# Patient Record
Sex: MALE | Race: WHITE | Employment: FULL TIME | URBAN - METROPOLITAN AREA
[De-identification: names, ages, dates, MRNs, and addresses within clinical notes are randomized per-mention and may not be internally consistent; named-entity substitution may affect disease eponyms.]

---

## 2022-06-20 ENCOUNTER — OFFICE VISIT (OUTPATIENT)
Dept: FAMILY MEDICINE CLINIC | Age: 22
End: 2022-06-20
Payer: COMMERCIAL

## 2022-06-20 ENCOUNTER — HOSPITAL ENCOUNTER (OUTPATIENT)
Age: 22
Discharge: HOME | End: 2022-06-20
Payer: COMMERCIAL

## 2022-06-20 ENCOUNTER — HOSPITAL ENCOUNTER (OUTPATIENT)
Dept: GENERAL RADIOLOGY | Age: 22
Discharge: HOME | End: 2022-06-20
Payer: COMMERCIAL

## 2022-06-20 VITALS
OXYGEN SATURATION: 98 % | WEIGHT: 288 LBS | BODY MASS INDEX: 35.81 KG/M2 | SYSTOLIC BLOOD PRESSURE: 132 MMHG | HEIGHT: 75 IN | HEART RATE: 100 BPM | DIASTOLIC BLOOD PRESSURE: 72 MMHG | TEMPERATURE: 97.4 F

## 2022-06-20 DIAGNOSIS — M25.572 PAIN AND SWELLING OF LEFT ANKLE: Primary | ICD-10-CM

## 2022-06-20 DIAGNOSIS — M25.572 PAIN AND SWELLING OF LEFT ANKLE: ICD-10-CM

## 2022-06-20 DIAGNOSIS — M25.472 PAIN AND SWELLING OF LEFT ANKLE: Primary | ICD-10-CM

## 2022-06-20 DIAGNOSIS — M25.472 PAIN AND SWELLING OF LEFT ANKLE: ICD-10-CM

## 2022-06-20 PROCEDURE — 73610 X-RAY EXAM OF ANKLE: CPT

## 2022-06-20 PROCEDURE — 99203 OFFICE O/P NEW LOW 30 MIN: CPT | Performed by: NURSE PRACTITIONER

## 2022-06-20 RX ORDER — IBUPROFEN 800 MG/1
800 TABLET ORAL EVERY 6 HOURS PRN
COMMUNITY

## 2022-06-20 ASSESSMENT — ENCOUNTER SYMPTOMS
COUGH: 0
SINUS PRESSURE: 0
WHEEZING: 0
RHINORRHEA: 0
DIARRHEA: 0
NAUSEA: 0
SHORTNESS OF BREATH: 0
CHEST TIGHTNESS: 0
SINUS PAIN: 0
VOMITING: 0
SORE THROAT: 0

## 2022-06-20 NOTE — PROGRESS NOTES
Jasiel Hua   25 y.o.  male  [id number]      Chief Complaint   Patient presents with    Ankle Injury     Was playing baseball last night and he stepped on a baseball and felt his ankle pop        Subjective:  21 y.o.male is here for a follow up. He has the following chronic/acute medical problems: There is no problem list on file for this patient. Ankle Pain   The incident occurred 12 to 24 hours ago. Incident location: while playing basketball. The injury mechanism was a twisting injury (stepped on baseball and fell). The pain is present in the left ankle. The quality of the pain is described as aching. The pain is at a severity of 3/10. The pain has been constant since onset. Associated symptoms include an inability to bear weight (not very well - using crutches). Pertinent negatives include no loss of motion, loss of sensation, muscle weakness, numbness or tingling. He reports no foreign bodies present. The symptoms are aggravated by weight bearing. He has tried ice, elevation and NSAIDs for the symptoms. The treatment provided mild relief. Review of Systems   Constitutional: Negative for appetite change, chills, fatigue and fever. HENT: Negative for congestion, ear pain, postnasal drip, rhinorrhea, sinus pressure, sinus pain, sneezing and sore throat. Respiratory: Negative for cough, chest tightness, shortness of breath and wheezing. Cardiovascular: Negative for chest pain and palpitations. Gastrointestinal: Negative for diarrhea, nausea and vomiting. Skin: Negative for rash. Neurological: Negative for dizziness, tingling, light-headedness, numbness and headaches. Current Outpatient Medications   Medication Sig Dispense Refill    ibuprofen (ADVIL;MOTRIN) 800 MG tablet Take 800 mg by mouth every 6 hours as needed for Pain      UNABLE TO FIND 1 pair of crutches to fit 6'3\". 1 each 0     No current facility-administered medications for this visit.         Past medical, family,surgical history reviewed today. Objective:  /72   Pulse 100   Temp 97.4 °F (36.3 °C) (Temporal)   Ht 6' 3\" (1.905 m)   Wt 288 lb (130.6 kg)   SpO2 98%   BMI 36.00 kg/m²   BP Readings from Last 3 Encounters:   06/20/22 132/72     Wt Readings from Last 3 Encounters:   06/20/22 288 lb (130.6 kg) (>99 %, Z= 2.92)*     * Growth percentiles are based on CDC (Boys, 2-20 Years) data. Physical Exam  Constitutional:       Appearance: Normal appearance. HENT:      Head: Normocephalic. Cardiovascular:      Rate and Rhythm: Normal rate and regular rhythm. Heart sounds: Normal heart sounds. Pulmonary:      Effort: Pulmonary effort is normal.      Breath sounds: Normal breath sounds. Musculoskeletal:      Cervical back: Neck supple. Left ankle: Swelling and ecchymosis present. No deformity or lacerations. Tenderness present over the lateral malleolus. Decreased range of motion. Anterior drawer test negative. Normal pulse. Skin:     General: Skin is warm and dry. Neurological:      Mental Status: He is alert and oriented to person, place, and time. Psychiatric:         Mood and Affect: Mood normal.         Behavior: Behavior normal.         No results found for: WBC, HGB, HCT, MCV, PLT  No results found for: NA, K, CL, CO2, BUN, CREATININE, GLUCOSE, CALCIUM, PROT, LABALBU, BILITOT, ALKPHOS, AST, ALT, LABGLOM, GFRAA, AGRATIO, GLOB  No results found for: CHOL  No results found for: TRIG  No results found for: HDL  No results found for: LDLCALC, LDLCHOLESTEROL  No results found for: LABA1C  No results found for: TSHFT4, TSH, TSHHS      ASSESSMENT/PLAN:      1. Pain and swelling of left ankle  RICE. Ibuprofen 600 mg Q 6 as needed for pain. Ordered xray left ankle. Will call with results. Continue to use crutches - new order give for patient to get a pair that fits him. - XR ANKLE LEFT (MIN 3 VIEWS); Future  - UNABLE TO FIND; 1 pair of crutches to fit 6'3\".   Dispense: 1 each; Refill: 0          There are no discontinued medications. Care discussed with patient. Questions answered. Patient verbalizes understanding and agrees with plan. After visit summary provided. Advised to call for any problems, questions, or concerns. Return if symptoms worsen or fail to improve.                                              Signed:  MARGIE Rivera CNP  06/20/22  12:48 PM

## 2022-06-21 DIAGNOSIS — S82.892A CLOSED AVULSION FRACTURE OF LEFT ANKLE, INITIAL ENCOUNTER: Primary | ICD-10-CM

## 2022-06-21 DIAGNOSIS — M19.072 ARTHROSIS OF ANKLE, LEFT: ICD-10-CM

## 2022-06-23 ENCOUNTER — OFFICE VISIT (OUTPATIENT)
Dept: ORTHOPEDIC SURGERY | Age: 22
End: 2022-06-23
Payer: COMMERCIAL

## 2022-06-23 VITALS
HEIGHT: 75 IN | HEART RATE: 79 BPM | BODY MASS INDEX: 35.61 KG/M2 | WEIGHT: 286.4 LBS | SYSTOLIC BLOOD PRESSURE: 137 MMHG | DIASTOLIC BLOOD PRESSURE: 85 MMHG

## 2022-06-23 DIAGNOSIS — S93.402A MODERATE LEFT ANKLE SPRAIN, INITIAL ENCOUNTER: Primary | ICD-10-CM

## 2022-06-23 PROCEDURE — G8417 CALC BMI ABV UP PARAM F/U: HCPCS | Performed by: PHYSICIAN ASSISTANT

## 2022-06-23 PROCEDURE — 99203 OFFICE O/P NEW LOW 30 MIN: CPT | Performed by: PHYSICIAN ASSISTANT

## 2022-06-23 PROCEDURE — 1036F TOBACCO NON-USER: CPT | Performed by: PHYSICIAN ASSISTANT

## 2022-06-23 PROCEDURE — G8427 DOCREV CUR MEDS BY ELIG CLIN: HCPCS | Performed by: PHYSICIAN ASSISTANT

## 2022-06-23 ASSESSMENT — ENCOUNTER SYMPTOMS
RESPIRATORY NEGATIVE: 1
EYES NEGATIVE: 1
GASTROINTESTINAL NEGATIVE: 1

## 2022-06-23 NOTE — PATIENT INSTRUCTIONS
Continue to weight bear as tolerated  Tylenol and Motrin for pain  Okay to work on range of motion exercises  Patient fitted with lace up ankle brace today, wear ankle brace when ambulating especially at work. You may remove the brace when at home to work on range of motion.   After 3 weeks start working on strengthening exercises

## 2022-06-23 NOTE — PROGRESS NOTES
 Lack of Transportation (Non-Medical): Not on file   Physical Activity:     Days of Exercise per Week: Not on file    Minutes of Exercise per Session: Not on file   Stress:     Feeling of Stress : Not on file   Social Connections:     Frequency of Communication with Friends and Family: Not on file    Frequency of Social Gatherings with Friends and Family: Not on file    Attends Rastafari Services: Not on file    Active Member of [address] [place] [address] or Organizations: Not on file    Attends Club or Organization Meetings: Not on file    Marital Status: Not on file   Intimate Partner Violence:     Fear of Current or Ex-Partner: Not on file    Emotionally Abused: Not on file    Physically Abused: Not on file    Sexually Abused: Not on file   Housing Stability:     Unable to Pay for Housing in the Last Year: Not on file    Number of [place] in the Last Year: Not on file    Unstable Housing in the Last Year: Not on file       Current Outpatient Medications   Medication Sig Dispense Refill    ibuprofen (ADVIL;MOTRIN) 800 MG tablet Take 800 mg by mouth every 6 hours as needed for Pain (Patient not taking: Reported on 6/23/2022)      UNABLE TO FIND 1 pair of crutches to fit 6'3\". (Patient not taking: Reported on 6/23/2022) 1 each 0     No current facility-administered medications for this visit. Allergies   Allergen Reactions    Origanum Oil Hives     Ears get red and hot    Other Hives    Cinnamon Nausea And Vomiting       Review of Systems:    Review of Systems   Constitutional: Negative. HENT: Negative. Eyes: Negative. Respiratory: Negative. Cardiovascular: Negative. Gastrointestinal: Negative. Genitourinary: Negative. Musculoskeletal: Positive for arthralgias. Skin: Negative. Neurological: Negative. Psychiatric/Behavioral: Negative.         Physical Exam:   /85 (Site: Right Wrist, Position: Sitting)   Pulse 79   Ht 6' 3\" (1.905 m)   Wt 286 lb 6.4 oz (129.9 kg) BMI 35.80 kg/m²       Gait is Antalgic. The patient can bear weight on the injured extremity. Gen/Psych:Examination reveals a pleasant individual in no acute distress. The patient is oriented to time, place and person. The patient's mood and affect are appropriate. Patient appears well nourished. Body habitus is overweight    HEENT: Head is atraumatic normocephalic,  ears are symmetric, eyes show equal pupils bilaterally, extraocular muscles intact. Hearing is intact to normal voice at 5 feet. Lymph:  no lymphedema in bilateral lower extremities     Skin: intact in bilateral lower extremities with no ulcerations, lesions, rash, erythema. Vascular: There are no varicosities in bilateral lower extremities, sensation intact to light touch over bilateral lower extremities. Musculoskeletal:     Left ankle/foot exam:  Inspection: Ecchymosis over the lateral aspect of the ankle and also over the dorsal aspect of the foot. Palpation: Tenderness to palpation over the ATFL but no tenderness over the medial malleolus or midfoot. Range of motion/Strength   Dorsiflexion 5 degrees, 5 -/5   Plantarflexion 40 degrees, 5/5   Eversion 5 degrees, 5 -/5   Inversion 10 degrees, 5 -/5  Stability: anterior drawer negative, squeeze test negative        There is + L2-S1 motor and sensory function. Outside record review: Outside x-rays reviewed    Imaging:   X-rays of the left ankle were reviewed from outside facility which showed small avulsion either off the talus or inferior tip of the fibula. There was moderate soft tissue swelling but no other fracture seen. Impression:     Diagnosis Orders   1. Moderate left ankle sprain, initial encounter           Plan:   Patient Instructions   Continue to weight bear as tolerated  Tylenol and Motrin for pain  Okay to work on range of motion exercises  Patient fitted with lace up ankle brace today, wear ankle brace when ambulating especially at work.   You may remove the brace when at home to work on range of motion.   After 3 weeks start working on strengthening exercises